# Patient Record
Sex: MALE | Race: WHITE
[De-identification: names, ages, dates, MRNs, and addresses within clinical notes are randomized per-mention and may not be internally consistent; named-entity substitution may affect disease eponyms.]

---

## 2021-12-20 ENCOUNTER — APPOINTMENT (OUTPATIENT)
Dept: UROLOGY | Facility: CLINIC | Age: 71
End: 2021-12-20
Payer: MEDICARE

## 2021-12-20 VITALS
SYSTOLIC BLOOD PRESSURE: 167 MMHG | HEIGHT: 71 IN | HEART RATE: 67 BPM | WEIGHT: 195 LBS | BODY MASS INDEX: 27.3 KG/M2 | DIASTOLIC BLOOD PRESSURE: 101 MMHG

## 2021-12-20 DIAGNOSIS — R31.29 OTHER MICROSCOPIC HEMATURIA: ICD-10-CM

## 2021-12-20 PROBLEM — Z00.00 ENCOUNTER FOR PREVENTIVE HEALTH EXAMINATION: Status: ACTIVE | Noted: 2021-12-20

## 2021-12-20 LAB
BILIRUB UR QL STRIP: NORMAL
CLARITY UR: CLEAR
COLLECTION METHOD: NORMAL
GLUCOSE UR-MCNC: NORMAL
HCG UR QL: 0.2 EU/DL
HGB UR QL STRIP.AUTO: NORMAL
KETONES UR-MCNC: NORMAL
LEUKOCYTE ESTERASE UR QL STRIP: NORMAL
NITRITE UR QL STRIP: NORMAL
PH UR STRIP: 5.5
PROT UR STRIP-MCNC: NORMAL
SP GR UR STRIP: >=1030

## 2021-12-20 PROCEDURE — 81003 URINALYSIS AUTO W/O SCOPE: CPT | Mod: QW

## 2021-12-20 PROCEDURE — 99203 OFFICE O/P NEW LOW 30 MIN: CPT

## 2021-12-21 LAB
APPEARANCE: ABNORMAL
BACTERIA: NEGATIVE
BILIRUBIN URINE: NEGATIVE
BLOOD URINE: NORMAL
COLOR: YELLOW
CREAT SPEC-SCNC: 198 MG/DL
GLUCOSE QUALITATIVE U: NEGATIVE
HYALINE CASTS: 0 /LPF
KETONES URINE: NEGATIVE
LEUKOCYTE ESTERASE URINE: NEGATIVE
MICROALBUMIN 24H UR DL<=1MG/L-MCNC: 2.2 MG/DL
MICROALBUMIN/CREAT 24H UR-RTO: 11 MG/G
MICROSCOPIC-UA: NORMAL
NITRITE URINE: NEGATIVE
PH URINE: 5.5
PROTEIN URINE: NORMAL
RED BLOOD CELLS URINE: 2 /HPF
SPECIFIC GRAVITY URINE: 1.02
SQUAMOUS EPITHELIAL CELLS: 0 /HPF
UROBILINOGEN URINE: NORMAL
WHITE BLOOD CELLS URINE: 0 /HPF

## 2021-12-21 NOTE — ASSESSMENT
[FreeTextEntry1] : \par =======================================================================================\par ASSESSMENT and PLAN\par \par The patient is a 71 year male with a history of the following:\par \par 1. Renal cysts:\par He has some new cysts but they are not dangerous appearing. He does not want a CT at this time. We will do a renal bladder sonogram in one year and see him after that.\par \par 2. r/o proteinuria\par Alb:creat ratio\par \par 3. r/o microhematuria\par will send Ua micro\par \par 4. LUTS with mildly elevated PSa on tamsulosin\par We will have him do a RBUS in one year. He is not bothered by his LUTS.\par His PVR is mildly elevated.\par \par -----------------------------------------------------------------------------------------------------\par LABS/TESTS Ordered: UA micro, Alb:Creat ratio, RBUS one year\par Meds Ordered:\par Follow up: After RBUS\par -----------------------------------------------------------------------------------------------------\par \par Greater than 50% of this 40 minute visit was spent counseling the patient and coordinating care.\par \par Thank you for allowing me to assist in the care of your patient. Should you have any questions please do not hesitate to reach out to me.\par \par \par Risa Fontana MD\par Associate \[facility] Department of Urology\[facility] Phone: [phone]\par Fax: [phone]\[facility] \[address]\[facility] [address]\[facility]  detailed exam

## 2021-12-21 NOTE — ADDENDUM
[FreeTextEntry1] : 12/20/2021: Alb Creat ratio 11 (normal)\par UA micro:  Neg NIT/LE/GLC, 0 Epi, 0 WBC, 2 RBC/hpfk, Trace protein

## 2021-12-21 NOTE — HISTORY OF PRESENT ILLNESS
[FreeTextEntry1] : Language: English\par Date of First visit: 12/20/2021\par Accompanied by: Self\par Contact info: [phone]\par Referring Provider/PCP: Risa\par \par \par \par CC/ Problem List:\par \par ===============================================================================\par FIRST VISIT:\par The patient is a 71 year male who first presents 12/20/2021 for renal cysts.\par \par he is on tamslosin for LUTS and he has nocturia once early in AM. \par He denies urgency, hematuria, dysuria, straining to void. He occasionally has some leaking.\par He has no FH of kidney issues. He has no smoking history.\par \par In mid Dec 2021 he awoke with bad abd pain which resolved with two pepto bismol.\par \par He is Syriac and loves bread.\par \par \par -------------------------------------------------------------------------------------------\par INTERVAL VISITS:\par \par \par ===============================================================================\par \par PMH: HTN\par PSH: Elbow\par POBH: (if applicable)\par FH: Denies\par \par ALL: NKDA\par MEDS: Tamsulosin, Losartan, MVI, sean tea\par SOC: Denies Tob, rare EtOH, Denies drugs\par \par \par ROS: Review of Systems is as per HPI unless otherwise denoted below\par \par \par ===============================================================================\par DATA: \par \par LABS:-------------------------------------------------------------------------------------------------------------------\par 8/12/2021: PSA 2.30\par \par 12/20/2021: UA micro Small Blood 30 PRT, Neg NIT/LE/GLC\par \par RADS:-------------------------------------------------------------------------------------------------------------------\par 12/8/2014: CTwwoC\par Kidneys ar enormal with BL renal cysts up to 3.7cm in LUP\par Prostate is mildly enlarged and heterogenous 4.5cm in max dimension\par \par 8/30/2016: Renal sono\par  Kidneys normal with no stones; multiple BL renal cysts without suspicious features up to 3.8cm in LUP\par \par 12/9/2021: Sono Abd\par Liver sono with mild fatty infilitration.GB normal. Pancreas is normal. Kidneys are normal. new 1.6cm RMP cyst and new 1.6cm RLP cyst. New 1.2cm RLP cyst. Left kidney normal with significant increase in size of 3.8cm LUP cyst; new 1.3cm LUP cyst. \par \par \par \par PATHOLOGY/CYTOLOGY:-------------------------------------------------------------------------------------------\par \par \par \par VOIDING STUDIES: ----------------------------------------------------------------------------------------------------\par 12/20/2021: PVR 72cc\par \par \par STONE STUDIES: (Analysis/LLSA)----------------------------------------------------------------------------------\par \par \par \par PROCEDURES: -----------------------------------------------------------------------------------------------\par \par \par \par \par ===============================================================================\par \par PHYSICAL EXAM:\par \par GEN: AAOx3, NAD; Habitus: normal\par \par BARRIERS to CARE: none\par \par PSYCH: Appropriate Behavior, Affect Congruent\par \par HEENT: AT/NC Trachea midline. EOMI.\par \par Lungs: No labored breathing.\par \par NEURO: + Movement, all 4 extremities grossly intact without deficits. No tremors.\par \par SKIN: Warm dry. No visible rashes or ulcers\par \par GAIT: Gait normal, Stability good\par \par \par =======================================================================================\par \par

## 2022-12-19 ENCOUNTER — APPOINTMENT (OUTPATIENT)
Dept: UROLOGY | Facility: CLINIC | Age: 72
End: 2022-12-19

## 2024-05-30 ENCOUNTER — EMERGENCY (EMERGENCY)
Facility: HOSPITAL | Age: 74
LOS: 0 days | Discharge: HOME | End: 2024-05-31
Attending: EMERGENCY MEDICINE
Payer: MEDICARE

## 2024-05-30 VITALS
OXYGEN SATURATION: 100 % | SYSTOLIC BLOOD PRESSURE: 161 MMHG | HEART RATE: 71 BPM | WEIGHT: 195.11 LBS | TEMPERATURE: 99 F | DIASTOLIC BLOOD PRESSURE: 91 MMHG | RESPIRATION RATE: 18 BRPM

## 2024-05-30 DIAGNOSIS — N40.0 BENIGN PROSTATIC HYPERPLASIA WITHOUT LOWER URINARY TRACT SYMPTOMS: ICD-10-CM

## 2024-05-30 DIAGNOSIS — H53.2 DIPLOPIA: ICD-10-CM

## 2024-05-30 DIAGNOSIS — I82.C12 ACUTE EMBOLISM AND THROMBOSIS OF LEFT INTERNAL JUGULAR VEIN: ICD-10-CM

## 2024-05-30 DIAGNOSIS — H53.8 OTHER VISUAL DISTURBANCES: ICD-10-CM

## 2024-05-30 DIAGNOSIS — E78.5 HYPERLIPIDEMIA, UNSPECIFIED: ICD-10-CM

## 2024-05-30 LAB
ALBUMIN SERPL ELPH-MCNC: 4 G/DL — SIGNIFICANT CHANGE UP (ref 3.5–5.2)
ALP SERPL-CCNC: 66 U/L — SIGNIFICANT CHANGE UP (ref 30–115)
ALT FLD-CCNC: 10 U/L — SIGNIFICANT CHANGE UP (ref 0–41)
ANION GAP SERPL CALC-SCNC: 9 MMOL/L — SIGNIFICANT CHANGE UP (ref 7–14)
APTT BLD: 32 SEC — SIGNIFICANT CHANGE UP (ref 27–39.2)
AST SERPL-CCNC: 12 U/L — SIGNIFICANT CHANGE UP (ref 0–41)
BASOPHILS # BLD AUTO: 0.02 K/UL — SIGNIFICANT CHANGE UP (ref 0–0.2)
BASOPHILS NFR BLD AUTO: 0.4 % — SIGNIFICANT CHANGE UP (ref 0–1)
BILIRUB SERPL-MCNC: 0.6 MG/DL — SIGNIFICANT CHANGE UP (ref 0.2–1.2)
BUN SERPL-MCNC: 22 MG/DL — HIGH (ref 10–20)
CALCIUM SERPL-MCNC: 9.1 MG/DL — SIGNIFICANT CHANGE UP (ref 8.4–10.4)
CHLORIDE SERPL-SCNC: 104 MMOL/L — SIGNIFICANT CHANGE UP (ref 98–110)
CO2 SERPL-SCNC: 25 MMOL/L — SIGNIFICANT CHANGE UP (ref 17–32)
CREAT SERPL-MCNC: 1.2 MG/DL — SIGNIFICANT CHANGE UP (ref 0.7–1.5)
EGFR: 63 ML/MIN/1.73M2 — SIGNIFICANT CHANGE UP
EOSINOPHIL # BLD AUTO: 0.06 K/UL — SIGNIFICANT CHANGE UP (ref 0–0.7)
EOSINOPHIL NFR BLD AUTO: 1.3 % — SIGNIFICANT CHANGE UP (ref 0–8)
GLUCOSE SERPL-MCNC: 95 MG/DL — SIGNIFICANT CHANGE UP (ref 70–99)
HCT VFR BLD CALC: 38 % — LOW (ref 42–52)
HGB BLD-MCNC: 12.5 G/DL — LOW (ref 14–18)
IMM GRANULOCYTES NFR BLD AUTO: 0.2 % — SIGNIFICANT CHANGE UP (ref 0.1–0.3)
INR BLD: 1.17 RATIO — SIGNIFICANT CHANGE UP (ref 0.65–1.3)
LYMPHOCYTES # BLD AUTO: 0.79 K/UL — LOW (ref 1.2–3.4)
LYMPHOCYTES # BLD AUTO: 17.1 % — LOW (ref 20.5–51.1)
MCHC RBC-ENTMCNC: 28.2 PG — SIGNIFICANT CHANGE UP (ref 27–31)
MCHC RBC-ENTMCNC: 32.9 G/DL — SIGNIFICANT CHANGE UP (ref 32–37)
MCV RBC AUTO: 85.6 FL — SIGNIFICANT CHANGE UP (ref 80–94)
MONOCYTES # BLD AUTO: 0.38 K/UL — SIGNIFICANT CHANGE UP (ref 0.1–0.6)
MONOCYTES NFR BLD AUTO: 8.2 % — SIGNIFICANT CHANGE UP (ref 1.7–9.3)
NEUTROPHILS # BLD AUTO: 3.36 K/UL — SIGNIFICANT CHANGE UP (ref 1.4–6.5)
NEUTROPHILS NFR BLD AUTO: 72.8 % — SIGNIFICANT CHANGE UP (ref 42.2–75.2)
NRBC # BLD: 0 /100 WBCS — SIGNIFICANT CHANGE UP (ref 0–0)
PLATELET # BLD AUTO: 156 K/UL — SIGNIFICANT CHANGE UP (ref 130–400)
PMV BLD: 11.6 FL — HIGH (ref 7.4–10.4)
POTASSIUM SERPL-MCNC: 4.4 MMOL/L — SIGNIFICANT CHANGE UP (ref 3.5–5)
POTASSIUM SERPL-SCNC: 4.4 MMOL/L — SIGNIFICANT CHANGE UP (ref 3.5–5)
PROT SERPL-MCNC: 8.1 G/DL — HIGH (ref 6–8)
PROTHROM AB SERPL-ACNC: 13.4 SEC — HIGH (ref 9.95–12.87)
RBC # BLD: 4.44 M/UL — LOW (ref 4.7–6.1)
RBC # FLD: 14.2 % — SIGNIFICANT CHANGE UP (ref 11.5–14.5)
SODIUM SERPL-SCNC: 138 MMOL/L — SIGNIFICANT CHANGE UP (ref 135–146)
TROPONIN T, HIGH SENSITIVITY RESULT: 14 NG/L — SIGNIFICANT CHANGE UP (ref 6–21)
TROPONIN T, HIGH SENSITIVITY RESULT: 16 NG/L — SIGNIFICANT CHANGE UP (ref 6–21)
WBC # BLD: 4.62 K/UL — LOW (ref 4.8–10.8)
WBC # FLD AUTO: 4.62 K/UL — LOW (ref 4.8–10.8)

## 2024-05-30 PROCEDURE — 80053 COMPREHEN METABOLIC PANEL: CPT

## 2024-05-30 PROCEDURE — 70498 CT ANGIOGRAPHY NECK: CPT

## 2024-05-30 PROCEDURE — 70450 CT HEAD/BRAIN W/O DYE: CPT | Mod: XU,MC

## 2024-05-30 PROCEDURE — 93005 ELECTROCARDIOGRAM TRACING: CPT

## 2024-05-30 PROCEDURE — 70496 CT ANGIOGRAPHY HEAD: CPT | Mod: MC

## 2024-05-30 PROCEDURE — 70498 CT ANGIOGRAPHY NECK: CPT | Mod: 26

## 2024-05-30 PROCEDURE — 70450 CT HEAD/BRAIN W/O DYE: CPT | Mod: 26,XU,MC

## 2024-05-30 PROCEDURE — 93010 ELECTROCARDIOGRAM REPORT: CPT

## 2024-05-30 PROCEDURE — 85730 THROMBOPLASTIN TIME PARTIAL: CPT

## 2024-05-30 PROCEDURE — 85610 PROTHROMBIN TIME: CPT

## 2024-05-30 PROCEDURE — 36415 COLL VENOUS BLD VENIPUNCTURE: CPT

## 2024-05-30 PROCEDURE — 99285 EMERGENCY DEPT VISIT HI MDM: CPT

## 2024-05-30 PROCEDURE — 99285 EMERGENCY DEPT VISIT HI MDM: CPT | Mod: 25

## 2024-05-30 PROCEDURE — 84484 ASSAY OF TROPONIN QUANT: CPT

## 2024-05-30 PROCEDURE — 85025 COMPLETE CBC W/AUTO DIFF WBC: CPT

## 2024-05-30 PROCEDURE — 70496 CT ANGIOGRAPHY HEAD: CPT | Mod: 26,MC,76

## 2024-05-30 PROCEDURE — 36000 PLACE NEEDLE IN VEIN: CPT | Mod: XU

## 2024-05-30 NOTE — ED ADULT TRIAGE NOTE - CHIEF COMPLAINT QUOTE
Patient was sent in by PMD for LIJ occlusion. As per patient Monday he had 4 hours of blurred vision that has subsided and not returned . No other neuromotor deficits noted in triage

## 2024-05-30 NOTE — CONSULT NOTE ADULT - SUBJECTIVE AND OBJECTIVE BOX
Neurology Stroke Consult Note    HPI: 73-year-old male history of HTN, BPH, presented to ED 2 days after 4-5 hour episode of blurry vision and diplopia on 5/28 that has resolved and has not returned. After episode, pt went to ophthalmologist who informed him he might have had a TIA and sent him ot cardiologist for carotid doppler who pt saw today and informed him the doppler showed reduced flow and possible left IJ thrombus. Pt states the blurry vision was not severe, "almost fuzzy" and the diplopia was constant stacked vision, which he has never experienced before. Pt states he has taken a baby asprin daily since going ot the ophthalmologist. Pt is asymptomatic at this time. Pt denies stroke hx, smoking hx, hx of blood clots,     PAST MEDICAL & SURGICAL HISTORY:      FAMILY HISTORY:      SOCIAL HISTORY:  Denies smoking, drinking, or drug use    ROS: as per HPI     MEDICATIONS  (STANDING):    MEDICATIONS  (PRN):      Allergies    No Known Allergies    Intolerances        Vital Signs Last 24 Hrs  T(C): 37.1 (30 May 2024 14:34), Max: 37.1 (30 May 2024 14:34)  T(F): 98.7 (30 May 2024 14:34), Max: 98.7 (30 May 2024 14:34)  HR: 71 (30 May 2024 14:34) (71 - 71)  BP: 161/91 (30 May 2024 14:34) (161/91 - 161/91)  BP(mean): --  RR: 18 (30 May 2024 14:34) (18 - 18)  SpO2: 100% (30 May 2024 14:34) (100% - 100%)    Parameters below as of 30 May 2024 14:34  Patient On (Oxygen Delivery Method): room air        Physical exam:  General: No acute distress, awake and alert    Neurologic:  -Mental status: Awake, alert, oriented to person, place, and time. Speech is fluent with intact naming, repetition, and comprehension, no dysarthria. Recent and remote memory intact. Follows commands. Attention/concentration intact. Fund of knowledge appropriate.  -Cranial nerves:   II: Visual fields are full to confrontation.  III, IV, VI: Extraocular movements are intact without nystagmus. Pupils equally round and reactive to light  V:  Facial sensation V1-V3 equal and intact   VII: Face is symmetric with normal eye closure and smile  Motor: Normal bulk and tone. No pronator drift. Strength bilateral upper extremity 5/5, bilateral lower extremities 5/5.  Sensation: Intact to light touch bilaterally. No neglect or extinction on double simultaneous testing.  Coordination: No dysmetria on finger-to-nose and heel-to-shin bilaterally  Reflexes: Downgoing toes bilaterally     NIHSS: 0    LABS:                        12.5   4.62  )-----------( 156      ( 30 May 2024 15:15 )             38.0     05-30    138  |  104  |  22<H>  ----------------------------<  95  4.4   |  25  |  1.2    Ca    9.1      30 May 2024 15:15    TPro  8.1<H>  /  Alb  4.0  /  TBili  0.6  /  DBili  x   /  AST  12  /  ALT  10  /  AlkPhos  66  05-30    PT/INR - ( 30 May 2024 15:15 )   PT: 13.40 sec;   INR: 1.17 ratio         PTT - ( 30 May 2024 15:15 )  PTT:32.0 sec  Urinalysis Basic - ( 30 May 2024 15:15 )    Color: x / Appearance: x / SG: x / pH: x  Gluc: 95 mg/dL / Ketone: x  / Bili: x / Urobili: x   Blood: x / Protein: x / Nitrite: x   Leuk Esterase: x / RBC: x / WBC x   Sq Epi: x / Non Sq Epi: x / Bacteria: x        RADIOLOGY & ADDITIONAL TESTS:  < from: CT Head No Cont (05.30.24 @ 16:04) >  IMPRESSION:    CTHEAD:  No acute intracranial pathology.    CTA HEAD/NECK:  No large vessel occlusion, flow-limiting stenosis, aneurysm, or vascular   malformation.    < end of copied text >    < from: CT Venogram Brain w/ IV Cont (05.30.24 @ 19:29) >  IMPRESSION:    No large vessel occlusion, aneurysm, or vascular malformation.    Possible filling defectin the left common jugular vein resulting in less   than 50% luminal narrowing possibly representing thrombus identified on   ultrasound.    < end of copied text >             Neurology Stroke Consult Note    HPI: 73-year-old male history of HTN, BPH, presented to ED 2 days after 4-5 hour episode of Right eye blurry vision and right eye diplopia on 5/28 that has resolved and has not returned. After episode, pt went to ophthalmologist who informed him he might have had a TIA and sent him to cardiologist for carotid doppler who pt saw today and informed him the doppler showed reduced flow and possible left IJ thrombus. Pt states the blurry vision was not severe, "almost fuzzy" and the diplopia was constant stacked vision, which he has never experienced before. Pt states the vision changes were limited only the right eye. Pt states he has taken a baby asprin daily since going ot the ophthalmologist. Pt is asymptomatic at this time. Pt denies stroke hx, smoking hx, hx of blood clots.     PAST MEDICAL & SURGICAL HISTORY:      FAMILY HISTORY:      SOCIAL HISTORY:  Denies smoking, drinking, or drug use    ROS: as per HPI     MEDICATIONS  (STANDING):    MEDICATIONS  (PRN):      Allergies    No Known Allergies    Intolerances        Vital Signs Last 24 Hrs  T(C): 37.1 (30 May 2024 14:34), Max: 37.1 (30 May 2024 14:34)  T(F): 98.7 (30 May 2024 14:34), Max: 98.7 (30 May 2024 14:34)  HR: 71 (30 May 2024 14:34) (71 - 71)  BP: 161/91 (30 May 2024 14:34) (161/91 - 161/91)  BP(mean): --  RR: 18 (30 May 2024 14:34) (18 - 18)  SpO2: 100% (30 May 2024 14:34) (100% - 100%)    Parameters below as of 30 May 2024 14:34  Patient On (Oxygen Delivery Method): room air        Physical exam:  General: No acute distress, awake and alert    Neurologic:  -Mental status: Awake, alert, oriented to person, place, and time. Speech is fluent with intact naming, repetition, and comprehension, no dysarthria. Recent and remote memory intact. Follows commands. Attention/concentration intact. Fund of knowledge appropriate.  -Cranial nerves:   II: Visual fields are full to confrontation.  III, IV, VI: Extraocular movements are intact without nystagmus. Pupils equally round and reactive to light  V:  Facial sensation V1-V3 equal and intact   VII: Face is symmetric with normal eye closure and smile  Motor: Normal bulk and tone. No pronator drift. Strength bilateral upper extremity 5/5, bilateral lower extremities 5/5.  Sensation: Intact to light touch bilaterally. No neglect or extinction on double simultaneous testing.  Coordination: No dysmetria on finger-to-nose and heel-to-shin bilaterally  Reflexes: Downgoing toes bilaterally     NIHSS: 0    LABS:                        12.5   4.62  )-----------( 156      ( 30 May 2024 15:15 )             38.0     05-30    138  |  104  |  22<H>  ----------------------------<  95  4.4   |  25  |  1.2    Ca    9.1      30 May 2024 15:15    TPro  8.1<H>  /  Alb  4.0  /  TBili  0.6  /  DBili  x   /  AST  12  /  ALT  10  /  AlkPhos  66  05-30    PT/INR - ( 30 May 2024 15:15 )   PT: 13.40 sec;   INR: 1.17 ratio         PTT - ( 30 May 2024 15:15 )  PTT:32.0 sec  Urinalysis Basic - ( 30 May 2024 15:15 )    Color: x / Appearance: x / SG: x / pH: x  Gluc: 95 mg/dL / Ketone: x  / Bili: x / Urobili: x   Blood: x / Protein: x / Nitrite: x   Leuk Esterase: x / RBC: x / WBC x   Sq Epi: x / Non Sq Epi: x / Bacteria: x        RADIOLOGY & ADDITIONAL TESTS:  < from: CT Head No Cont (05.30.24 @ 16:04) >  IMPRESSION:    CTHEAD:  No acute intracranial pathology.    CTA HEAD/NECK:  No large vessel occlusion, flow-limiting stenosis, aneurysm, or vascular   malformation.    < end of copied text >    < from: CT Venogram Brain w/ IV Cont (05.30.24 @ 19:29) >  IMPRESSION:    No large vessel occlusion, aneurysm, or vascular malformation.    Possible filling defectin the left common jugular vein resulting in less   than 50% luminal narrowing possibly representing thrombus identified on   ultrasound.    < end of copied text >

## 2024-05-30 NOTE — ED PROVIDER NOTE - CLINICAL SUMMARY MEDICAL DECISION MAKING FREE TEXT BOX
Kay: Received from Dr. Salazar. Patient presented for evaluation of outpatient abnormal vascular ultrasound done by his cardiologist that showed potential left IJ thrombus.  Patient required labs, imaging with CTA of the head and neck and CTV of the neck.  No acute findings, only artifactual findings.  Neurology and vascular surgery consulted and evaluated patient and recommended no acute intervention.  Patient to be discharged to follow-up outpatient.

## 2024-05-30 NOTE — ED PROVIDER NOTE - CARE PROVIDER_API CALL
Mehrdad [address] [address]  Phone: [phone]  Fax: [phone]  Follow Up Time: 1-3 Days

## 2024-05-30 NOTE — CONSULT NOTE ADULT - NSCONSULTADDITIONALINFOA_GEN_ALL_CORE
Attending Note: Pt discussed with Neurology ACP and imaging reviewed. Pt reported transient monocular vertical diplopia and blurred vision in right eye on 5/27. Semiology less consistent with TIA given monocular nature of diplopia, suggesting intrinsic eye pathology, granted transient. Presented to ED for finding of possible L IJ thrombus seen on CUS with outpt Cardiology. CT head without acute process, no CVT appreciated. Agree with Vascular consult. May continue ASA as prescribed by Cardiology.

## 2024-05-30 NOTE — ED PROVIDER NOTE - PATIENT PORTAL LINK FT
You can access the FollowMyHealth Patient Portal offered by [facility] by registering at the following website: [email or web address]. By joining [employer]’s FollowMyHealth portal, you will also be able to view your health information using other applications (apps) compatible with our system.

## 2024-05-30 NOTE — ED PROVIDER NOTE - PHYSICAL EXAMINATION
Vital Signs: I have reviewed the initial vital signs.  Constitutional: well-nourished, appears stated age, no acute distress.  HEENT: Airway patent, moist MM, no erythema/swelling/deformity of oral structures. EOMI, PERRLA.  CV: regular rate, regular rhythm, well-perfused extremities, 2+ bilateral DP and radial pulses equal.  Lungs: Clear to ascultation bilaterally, no crackles, no wheezes, no increased work of breathing.  ABD: Non-tender, Non-distended, no guarding or rebound, no pulsatile mass, no hernias, no flank pain.   MSK: Neck supple, nontender, normal range of motion, no stepoff. Chest nontender. Back nontender   INTEG: Skin warm, dry, no rash.  NEURO: AAO x 3, normal speech, no facial asymmetry, negative pronator drift, no ataxia, negative Romberg, no nystagmus, peripheral vision intact, sensory equal and intact.

## 2024-05-30 NOTE — CONSULT NOTE ADULT - ASSESSMENT
73-year-old male history of HTN, BPH, presented to ED 2 days after 4-5 hour episode of Right eye blurry vision and right eye diplopia on 5/28 that has resolved and has not returned. After episode, pt went to ophthalmologist who informed him he might have had a TIA and sent him to cardiologist for carotid doppler who pt saw today and informed him the doppler showed reduced flow and possible left IJ thrombus. Pt states the blurry vision was not severe, "almost fuzzy" and the diplopia was constant stacked vision, which he has never experienced before. Pt states the vision changes were limited only the right eye. Pt states he has taken a baby asprin daily since going ot the ophthalmologist. Pt is asymptomatic at this time. CTH negative, CTA head and neck negative, CTV showing possible filling defectin the left common jugular vein resulting in less than 50% luminal narrowing possibly representing thrombus identified on ultrasound. NIHSS 0 and exam negative.     Impression: Pt with monocular blurry vision and diplopia that has now resolved with negative CTH, CTA head and neck, and CTV negative for sinus venous thrombosis. Due to monocular nature of pts symptoms, presentation less likely to be a TIA.     RECS:  -No further neurological work up  -Pt may be seen by cardiology and vascular for IJ thrombus on CTV.    Discussed with Dr. Arevalo

## 2024-05-30 NOTE — ED PROVIDER NOTE - CARE PROVIDERS DIRECT ADDRESSES
[email or web address]

## 2024-05-30 NOTE — ED PROVIDER NOTE - OBJECTIVE STATEMENT
73-year-old male history of hyperlipidemia BPH presenting for evaluation of vision changes.  On 528 patient reported with right eye blurriness for 4 to 5 hours, seen his ophthalmologist and was recommended to have a Cardiologic evaluation including carotid Doppler and echo, was advised to take baby aspirin daily and follow-up with cardiologist.  Saw cardiologist Dr. Nidia Lofton in [place], where carotid Doppler revealed low flow and possible left IJ thrombus, sent to the ED for further evaluation. 73-year-old male history of hyperlipidemia BPH presenting for evaluation of vision changes.  On 528 patient reported with right eye blurriness for 4 to 5 hours, seen his ophthalmologist and was recommended to have a Cardiologic evaluation including carotid Doppler and echo, was advised to take baby aspirin daily and follow-up with cardiologist.  Saw cardiologist Dr. Zafar in [place], where carotid Doppler revealed low flow and possible left IJ thrombus, sent to the ED for further evaluation.

## 2024-05-30 NOTE — ED PROVIDER NOTE - NSFOLLOWUPINSTRUCTIONS_ED_ALL_ED_FT
Please follow-up with your cardiologist and ophthalmologist within 1 week.      Blurred Vision, Adult       Having blurred vision means that you cannot see things clearly. Your vision may seem fuzzy or out of focus. It can involve your vision for objects that are close or far away. It may affect one or both eyes. There are many causes of blurred vision, including cataracts, macular degeneration, eye inflammation (uveitis), and diabetic retinopathy.    In many cases, blurred vision has to do with the shape of your eye. An abnormal eye shape means you cannot focus well (refractive error). When this happens, it can cause:    Faraway objects to look blurry (nearsightedness).  Close objects to look blurry (farsightedness).  Blurry vision at any distance (astigmatism).  Refractive errors are often corrected with glasses or contacts.      Blurred vision can be diagnosed based on your symptoms and a physical exam. Tell your health care provider about any other health problems you have, any recent eye injury, and any prior surgeries. You may need to see a health care provider who specializes in eye problems (ophthalmologist). Your treatment will depend on what is causing your blurred vision.      Follow these instructions at home:    Keep all follow-up visits as told by your health care provider. This is important. These include any visits to your eye specialists.  Do not drive or use heavy machinery if your vision is blurry.  Use eye drops only as told by your health care provider.  If you were prescribed glasses or contact lenses, wear the glasses or contacts as told by your health care provider.  Schedule eye exams regularly.  Pay attention to any changes in your symptoms.    Contact a health care provider if:    Your symptoms do not improve or they get worse.  You have:  New symptoms.  A headache.  Trouble seeing at night.  Trouble noticing the difference between colors.    You notice:    Drooping of your eyelids.  Drainage coming from your eyes.  A rash around your eyes.    Get help right away if:    You have:  Severe eye pain.  A severe headache.  A sudden change in vision.  A sudden loss of vision.  A vision change after an injury.  You notice flashing lights in your field of vision. Your field of vision is the area that you can see without moving your eyes.      Summary    Having blurred vision means that you cannot see things clearly. Your vision may seem fuzzy or out of focus.  There are many causes of blurred vision. In many cases, blurred vision has to do with an abnormal eye shape (refractive error), and it can be corrected with glasses or contact lenses.  Pay attention to any changes in your symptoms. Contact a health care provider if your symptoms do not improve or if you have any new symptoms.  This information is not intended to replace advice given to you by your health care provider. Make sure you discuss any questions you have with your health care provider.

## 2024-05-30 NOTE — ED PROVIDER NOTE - ATTENDING CONTRIBUTION TO CARE
73-year-old male with history of HTN, BPH, no other past medical history, presents with wife due to low blood flow noted to the left IJ on Doppler ultrasound performed today by his cardiologist.  He states on Monday, 3 days ago, he had 4 to 5 hours of right-sided blurry vision that self resolved and has not recurred.  Denies all other symptoms including pain, weakness or numbness, headache.  Went to his ophthalmologist and noted some vitreous floaters and sent to his cardiologist due to concern for TIA, and went to his cardiologist and found the above Doppler findings.  On exam, afebrile, hemodynamically stable, saturating well on room air, NAD, well appearing, sitting comfortably in bed, no WOB, speaking full sentences, head NCAT, pupils equal, EOMI, anicteric, MMM, no JVD, no carotid bruit, RRR, nml S1/S2, no m/r/g, lungs CTAB, no w/r/r, abd soft, NT, ND, nml BS, no rebound or guarding, AAO, CN's 3-12 intact, vision intact, motor 5/5 and sensation symmetric in all extremities, normal independent gait, TRIPP spontaneously, no leg cyanosis or edema, skin warm, well perfused, no rashes or hives.  Character concerning for TIA.  Patient already had full ophthalmologic exam and low suspicion for acute process to warrant intervention.  CTA 73-year-old male with history of HTN, BPH, no other past medical history, presents with wife due to low blood flow noted to the left IJ on Doppler ultrasound performed today by his cardiologist.  He states on Monday, 3 days ago, he had 4 to 5 hours of right-sided blurry vision that self resolved and has not recurred.  Denies all other symptoms including pain, weakness or numbness, headache.  Went to his ophthalmologist and noted some vitreous floaters and sent to his cardiologist due to concern for TIA, and went to his cardiologist and found the above Doppler findings.  On exam, afebrile, hemodynamically stable, saturating well on room air, NAD, well appearing, sitting comfortably in bed, no WOB, speaking full sentences, head NCAT, pupils equal, EOMI, anicteric, MMM, no JVD, no carotid bruit, RRR, nml S1/S2, no m/r/g, lungs CTAB, no w/r/r, abd soft, NT, ND, nml BS, no rebound or guarding, AAO, CN's 3-12 intact, vision intact, motor 5/5 and sensation symmetric in all extremities, normal independent gait, TRIPP spontaneously, no leg cyanosis or edema, skin warm, well perfused, no rashes or hives.  Character concerning for TIA.  Patient already had full ophthalmologic exam and low suspicion for acute process to warrant intervention.  CTA negative.  CTV shows possible filling defect in the left common jugular vein.  Neurology recommendations pending, as well as vascular recommendations.  Signed off care to Dr. NEERAJ Whalen who will f/u neuro and vasc recs.

## 2024-05-30 NOTE — ED PROVIDER NOTE - PROGRESS NOTE DETAILS
MM: Neurology consulted, rec CTV, will evaluate. ss Vascular consulted with prelim read. will evaluate. Marie: Signed off care to Dr. NEERAJ Whalen who will f/u neuro and vasc recs. Authored by Dr. Trujillo: Patient signed out to me from Dr. Smith. Patient reassessed and asymptomatic, no focal deficits, no blurry vision. Neurology and vascular evaluated patient, and have cleared the patient w no further recs or intervention. Thrombus that was previously visualized appears to be an artifact. Patient and his wife explained all results, and they are comfortable with discharge.

## 2024-05-30 NOTE — ED PROVIDER NOTE - DISCUSSED CASE WITH MULTISELECT OPTIONS
Asc Procedure Text (A): After obtaining clear surgical margins the patient was sent to an ASC for surgical repair.  The patient understands they will receive post-surgical care and follow-up from the ASC physician. Consultant

## 2024-05-31 VITALS
SYSTOLIC BLOOD PRESSURE: 158 MMHG | DIASTOLIC BLOOD PRESSURE: 75 MMHG | RESPIRATION RATE: 18 BRPM | HEART RATE: 61 BPM | OXYGEN SATURATION: 96 % | TEMPERATURE: 98 F